# Patient Record
Sex: MALE | Race: OTHER | HISPANIC OR LATINO | URBAN - METROPOLITAN AREA
[De-identification: names, ages, dates, MRNs, and addresses within clinical notes are randomized per-mention and may not be internally consistent; named-entity substitution may affect disease eponyms.]

---

## 2018-03-18 ENCOUNTER — EMERGENCY (EMERGENCY)
Facility: HOSPITAL | Age: 28
LOS: 1 days | Discharge: HOME | End: 2018-03-18
Attending: EMERGENCY MEDICINE | Admitting: EMERGENCY MEDICINE
Payer: OTHER MISCELLANEOUS

## 2018-03-18 VITALS
SYSTOLIC BLOOD PRESSURE: 115 MMHG | TEMPERATURE: 99 F | RESPIRATION RATE: 16 BRPM | HEART RATE: 82 BPM | OXYGEN SATURATION: 100 % | DIASTOLIC BLOOD PRESSURE: 59 MMHG

## 2018-03-18 LAB
APPEARANCE UR: SIGNIFICANT CHANGE UP
BACTERIA # UR AUTO: SIGNIFICANT CHANGE UP
BILIRUB UR-MCNC: NEGATIVE — SIGNIFICANT CHANGE UP
BLOOD UR QL VISUAL: NEGATIVE — SIGNIFICANT CHANGE UP
COLOR SPEC: YELLOW — SIGNIFICANT CHANGE UP
GLUCOSE UR-MCNC: NEGATIVE — SIGNIFICANT CHANGE UP
KETONES UR-MCNC: NEGATIVE — SIGNIFICANT CHANGE UP
LEUKOCYTE ESTERASE UR-ACNC: HIGH
MUCOUS THREADS # UR AUTO: SIGNIFICANT CHANGE UP
NITRITE UR-MCNC: NEGATIVE — SIGNIFICANT CHANGE UP
PH UR: 6 — SIGNIFICANT CHANGE UP (ref 4.6–8)
PROT UR-MCNC: 30 MG/DL — HIGH
RBC CASTS # UR COMP ASSIST: SIGNIFICANT CHANGE UP (ref 0–?)
SP GR SPEC: 1.02 — SIGNIFICANT CHANGE UP (ref 1–1.04)
SQUAMOUS # UR AUTO: SIGNIFICANT CHANGE UP
UROBILINOGEN FLD QL: NORMAL MG/DL — SIGNIFICANT CHANGE UP
WBC CLUMPS #/AREA URNS HPF: PRESENT — HIGH (ref 0–?)
WBC UR QL: >50 — HIGH (ref 0–?)

## 2018-03-18 PROCEDURE — 99283 EMERGENCY DEPT VISIT LOW MDM: CPT

## 2018-03-18 PROCEDURE — 72100 X-RAY EXAM L-S SPINE 2/3 VWS: CPT | Mod: 26

## 2018-03-18 RX ORDER — IBUPROFEN 200 MG
600 TABLET ORAL ONCE
Qty: 0 | Refills: 0 | Status: COMPLETED | OUTPATIENT
Start: 2018-03-18 | End: 2018-03-18

## 2018-03-18 RX ORDER — DIAZEPAM 5 MG
1 TABLET ORAL
Qty: 10 | Refills: 0 | OUTPATIENT
Start: 2018-03-18 | End: 2018-03-22

## 2018-03-18 RX ORDER — IBUPROFEN 200 MG
1 TABLET ORAL
Qty: 20 | Refills: 0 | OUTPATIENT
Start: 2018-03-18 | End: 2018-03-22

## 2018-03-18 RX ORDER — LIDOCAINE 4 G/100G
1 CREAM TOPICAL ONCE
Qty: 0 | Refills: 0 | Status: COMPLETED | OUTPATIENT
Start: 2018-03-18 | End: 2018-03-18

## 2018-03-18 RX ADMIN — Medication 600 MILLIGRAM(S): at 21:27

## 2018-03-18 RX ADMIN — LIDOCAINE 1 PATCH: 4 CREAM TOPICAL at 21:27

## 2018-03-18 NOTE — ED PROVIDER NOTE - MUSCULOSKELETAL MINIMAL EXAM
motor intact/10cm abrasion on the right lower back. no bony TTP/MUSCLE SPASMS/TENDERNESS/normal range of motion

## 2018-03-18 NOTE — ED PROVIDER NOTE - CRANIAL NERVE AND PUPILLARY EXAM
tongue is midline/cranial nerves 2-12 intact/extra-ocular movements intact/central and peripheral vision intact/cough reflex intact/corneal reflex intact/gag reflex intact

## 2018-03-18 NOTE — ED PROVIDER NOTE - CARE PLAN
Principal Discharge DX:	Lumbar strain, initial encounter  Assessment and plan of treatment:	Follow with your PMD within 48-72 hours.  Rest, no heavy lifting.  Warm compresses to area. Recommend Ortho consult to discuss possible MRI vs Physical Therapy- referral list provided if pain continues.  Light walking. Take Motrin 600 mg every 6-8 hours for pain with food, Valium 5mg every 12 hours as needed for muscle spasm- caution drowsiness/do not drive. Any worsening pain, weakness, numbness, bowel or urinary incontinence or ANY new concerning symptoms return to the Emergency Department.

## 2018-03-18 NOTE — ED PROVIDER NOTE - PLAN OF CARE
Follow with your PMD within 48-72 hours.  Rest, no heavy lifting.  Warm compresses to area. Recommend Ortho consult to discuss possible MRI vs Physical Therapy- referral list provided if pain continues.  Light walking. Take Motrin 600 mg every 6-8 hours for pain with food, Valium 5mg every 12 hours as needed for muscle spasm- caution drowsiness/do not drive. Any worsening pain, weakness, numbness, bowel or urinary incontinence or ANY new concerning symptoms return to the Emergency Department.

## 2018-03-18 NOTE — ED PROVIDER NOTE - PROGRESS NOTE DETAILS
HUGH Cooney- Interpreted via :    Pt prelim lumbar spine xray no acute fractures. Pain improved s/p Motrin and lidoderm. WIll DC home with rx's sent to pharm for Motrin and Valium (pt driving home). DC instructions discussed. ortho referral list given if needed and symptoms do not improve. HUGH Cooney- Interpreted via :    Pt prelim lumbar spine xray no acute fractures. Pain improved s/p Motrin and lidoderm. WIll DC home with rx's sent to pharm for Motrin and Valium (pt driving home). DC instructions discussed. ortho referral list given if needed and symptoms do not improve.  UA- borderline for possible infection- pt denies symptoms. Urine culture sent.  Also requesting to be tested for GC/Chlamydia although denies symptoms. No ABX rx'ed at this time

## 2018-03-18 NOTE — ED PROVIDER NOTE - MEDICAL DECISION MAKING DETAILS
26 y/o M pt with severe back pain, s/p significant fall. No signs of facial fx. XR ot lumbar spine. Sx treatment for presumed back spasms 26 y/o M pt with severe back pain, s/p significant fall. No signs of facial fx, no loose teeth. XR ot lumbar spine. Sx treatment for presumed back spasms

## 2018-03-18 NOTE — ED PROVIDER NOTE - ATTENDING CONTRIBUTION TO CARE
Dr. Washburn ED Attending - Dr. Washburn:  I performed the initial face to face bedside interview with this patient regarding history of present illness, review of symptoms and past medical, social and family history.  I completed an independent physical examination.  I was the initial provider who evaluated this patient.  The history, review of symptoms and examination was documented by the scribe in my presence and I attest to the accuracy of the documentation.  I have modified and updated scribe note as needed. I have signed out the follow up of any pending tests (i.e. labs, radiological studies) to the PA and have discussed the patient’s plan of care and disposition with the PA. 28 y/o M pt with severe back pain, s/p significant fall. No signs of facial fx, no loose teeth. XR ot lumbar spine. Sx treatment for presumed back spasms

## 2018-03-18 NOTE — ED ADULT TRIAGE NOTE - CHIEF COMPLAINT QUOTE
pt is a [employer] by [employer] employee and is a . pt s/p fall yesterday. states he was lifting a heavy package and fell. c/o back pain. denies loc.   pt is deaf is communicating via writing.

## 2018-03-20 LAB
BACTERIA UR CULT: SIGNIFICANT CHANGE UP
C TRACH RRNA SPEC QL NAA+PROBE: SIGNIFICANT CHANGE UP
N GONORRHOEA RRNA SPEC QL NAA+PROBE: SIGNIFICANT CHANGE UP
SPECIMEN SOURCE: SIGNIFICANT CHANGE UP
SPECIMEN SOURCE: SIGNIFICANT CHANGE UP

## 2018-04-18 ENCOUNTER — EMERGENCY (EMERGENCY)
Facility: HOSPITAL | Age: 28
LOS: 1 days | Discharge: HOME | End: 2018-04-18
Attending: EMERGENCY MEDICINE | Admitting: EMERGENCY MEDICINE
Payer: OTHER MISCELLANEOUS

## 2018-04-18 VITALS
HEART RATE: 74 BPM | DIASTOLIC BLOOD PRESSURE: 61 MMHG | SYSTOLIC BLOOD PRESSURE: 124 MMHG | TEMPERATURE: 98 F | RESPIRATION RATE: 15 BRPM | OXYGEN SATURATION: 100 %

## 2018-04-18 PROCEDURE — 99284 EMERGENCY DEPT VISIT MOD MDM: CPT | Mod: 25

## 2018-04-18 NOTE — ED ADULT TRIAGE NOTE - CHIEF COMPLAINT QUOTE
alert   deaf   is able to write states he fell down 2 flights of stairs ( each flight had 3 steps) in a building  yesterday slipped on water or oil  c/o right knee pain

## 2018-04-19 PROCEDURE — 73564 X-RAY EXAM KNEE 4 OR MORE: CPT | Mod: 26,RT

## 2018-04-19 RX ORDER — TETANUS TOXOID, REDUCED DIPHTHERIA TOXOID AND ACELLULAR PERTUSSIS VACCINE, ADSORBED 5; 2.5; 8; 8; 2.5 [IU]/.5ML; [IU]/.5ML; UG/.5ML; UG/.5ML; UG/.5ML
0.5 SUSPENSION INTRAMUSCULAR ONCE
Qty: 0 | Refills: 0 | Status: COMPLETED | OUTPATIENT
Start: 2018-04-19 | End: 2018-04-19

## 2018-04-19 RX ORDER — IBUPROFEN 200 MG
600 TABLET ORAL ONCE
Qty: 0 | Refills: 0 | Status: COMPLETED | OUTPATIENT
Start: 2018-04-19 | End: 2018-04-19

## 2018-04-19 RX ORDER — AZTREONAM 2 G
1 VIAL (EA) INJECTION
Qty: 14 | Refills: 0 | OUTPATIENT
Start: 2018-04-19 | End: 2018-04-25

## 2018-04-19 RX ADMIN — Medication 1 TABLET(S): at 01:40

## 2018-04-19 RX ADMIN — TETANUS TOXOID, REDUCED DIPHTHERIA TOXOID AND ACELLULAR PERTUSSIS VACCINE, ADSORBED 0.5 MILLILITER(S): 5; 2.5; 8; 8; 2.5 SUSPENSION INTRAMUSCULAR at 01:40

## 2018-04-19 RX ADMIN — Medication 600 MILLIGRAM(S): at 01:40

## 2018-04-19 NOTE — ED PROVIDER NOTE - MUSCULOSKELETAL [+], MLM
worsening (walking), right knee pain/redness (at the top of the patella)/swelling and difficulty bearing weight/JOINT PAIN

## 2018-04-19 NOTE — ED PROVIDER NOTE - PROGRESS NOTE DETAILS
dorsey: pain meds improved.  able to range knee.  ace wrap and cane provided.  dx knee contusion and superficial cellulitis of patellar region without effusion or joint involvement. rx bactrim bid x 7 days.  motrin and tylenol for pain.  monitor redness and worsening symptoms.  close return precautions. left ambulatory.  return precautions given.

## 2018-04-19 NOTE — ED PROVIDER NOTE - LOWER EXTREMITY EXAM, RIGHT
warm to touch at the top of the patella, mild abrasion on right knee. no swelling. FROM actively and passively. Unable to bear weight. Negative Lachman, and negative Valgus and Varus

## 2018-04-19 NOTE — ED PROVIDER NOTE - OBJECTIVE STATEMENT
26 y/o M pt, as translated by  (ID number pending), with PMHx of Deafness, arrives to the ED c/o worsening (walking), right knee pain/redness (at the top of the patella)/swelling and difficulty bearing weight, s/p a mechanical slip and fall down 6 steps yesterday, where pt hit his right knee on the ground. Pt notes that he felt a pulling sensation medially. No social hx, including drug use or EtOH abuse. Denies numbness/tingling, head trauma, LOC or any other complaints. NKDA. 28 y/o M pt, communication via writing (pt decline ), with PMHx of Deafness, arrives to the ED c/o worsening (walking), right knee pain/redness (at the top of the patella)/swelling and difficulty bearing weight, s/p a mechanical slip and fall down 6 steps yesterday, where pt hit his right knee on the ground. Pt notes that he felt a pulling sensation medially. No social hx, including drug use or EtOH abuse. Denies numbness/tingling, head trauma, LOC or any other complaints. NKDA. 28 y/o M pt, communication via writing (pt decline ), with PMHx of Deafness, arrives to the ED c/o worsening (walking), right knee pain/redness (at the top of the patella)/swelling and difficulty bearing weight, s/p a mechanical slip and fall down 6 steps yesterday, where pt hit his right knee on the ground. Pt notes that he felt a pulling sensation medially. No social hx, including drug use or EtOH abuse. Denies numbness/tingling, head trauma, LOC or any other complaints. NKDA.    ANGELA MILLER-  -came and was able to confirmed the story

## 2018-04-19 NOTE — ED PROVIDER NOTE - CHPI ED SYMPTOMS POS
JOINT SWELLING/worsening (walking), right knee pain/redness (at the top of the patella)/swelling/EDEMA/INFLAMMATION/DIFFICULTY BEARING WEIGHT/PAIN

## 2018-04-19 NOTE — ED PROVIDER NOTE - MEDICAL DECISION MAKING DETAILS
26 y/o M pt with a mechanical slip and fall, here with a right knee abrasion and right knee pain. XR to rule out fx/contusion. Pt also has underlying Cellulitis, that is not involved in the joint. Give Augmentin. Reassess.

## 2019-02-24 NOTE — ED PROVIDER NOTE - OBJECTIVE STATEMENT
normal... 28 y/o M pt,  name, Brisa Machado (ID# [id number]), with no sig PMHx, arrives to the ED c/o severe back pain/tightness and facial pain s/p tripping and falling over a truck while lifting a heavy box yesterday at work ([employer] employee). Pt states that when he fell, he fell onto his right sided back and then his face; pt started to bleed from his mouth, but has since resolved. Pt also notes that he continued to attempt to work, but was sent home due to significant pain. Pt drove here. Ambulatory. Denies N/V/D, LOC, fever, chills, loose teeth, urinary sx, or any other complaints. NKDA 26 y/o M deaf (: Brisa Machado ID# [id number]), with no sig PMHx aside from hearing loss, arrives to the ED c/o severe back pain/tightness and facial pain s/p tripping and falling in his truck while lifting a heavy box yesterday at work ([employer] employee). Pt states that when he fell, he fell onto his right sided back and then his face; pt started to bleed from his mouth, but has since resolved. Pt also notes that he continued to attempt to work, but was sent home due to significant pain. Pt drove here. Ambulatory. Denies N/V/D, LOC, fever, chills, loose teeth, urinary sx, or any other complaints. Appears uncomfortable, feels better when laying still in bed. NKDA

## 2024-01-05 NOTE — ED PROVIDER NOTE - VASCULAR COMPROMISE
no vascular compromise
Seen and examined, have discussed plan of care with PA.   I agree with note as stated above, having amended the EMR as needed to reflect the findings.

## 2025-05-21 ENCOUNTER — NON-APPOINTMENT (OUTPATIENT)
Age: 35
End: 2025-05-21

## 2025-05-22 ENCOUNTER — APPOINTMENT (OUTPATIENT)
Dept: UROLOGY | Facility: CLINIC | Age: 35
End: 2025-05-22
Payer: MEDICAID

## 2025-05-22 ENCOUNTER — NON-APPOINTMENT (OUTPATIENT)
Age: 35
End: 2025-05-22

## 2025-05-22 VITALS
BODY MASS INDEX: 23.78 KG/M2 | HEIGHT: 66 IN | TEMPERATURE: 98.7 F | SYSTOLIC BLOOD PRESSURE: 100 MMHG | HEART RATE: 81 BPM | DIASTOLIC BLOOD PRESSURE: 66 MMHG | WEIGHT: 148 LBS

## 2025-05-22 DIAGNOSIS — I86.1 SCROTAL VARICES: ICD-10-CM

## 2025-05-22 PROCEDURE — 99204 OFFICE O/P NEW MOD 45 MIN: CPT

## 2025-05-22 PROCEDURE — G2211 COMPLEX E/M VISIT ADD ON: CPT | Mod: NC

## 2025-05-22 RX ORDER — TADALAFIL 20 MG/1
20 TABLET ORAL
Qty: 10 | Refills: 11 | Status: ACTIVE | COMMUNITY
Start: 2025-05-22 | End: 1900-01-01

## 2025-06-05 ENCOUNTER — APPOINTMENT (OUTPATIENT)
Dept: UROLOGY | Facility: CLINIC | Age: 35
End: 2025-06-05

## 2025-06-18 NOTE — ED ADULT TRIAGE NOTE - CCCP TRG CHIEF CMPLNT
The right groin was prepped. The site was prepped with ChloraPrep. The site was clipped. right knee pain

## 2025-07-10 ENCOUNTER — APPOINTMENT (OUTPATIENT)
Dept: UROLOGY | Facility: CLINIC | Age: 35
End: 2025-07-10

## 2025-08-06 ENCOUNTER — APPOINTMENT (OUTPATIENT)
Dept: UROLOGY | Facility: CLINIC | Age: 35
End: 2025-08-06

## 2025-09-02 ENCOUNTER — APPOINTMENT (OUTPATIENT)
Dept: UROLOGY | Facility: CLINIC | Age: 35
End: 2025-09-02